# Patient Record
Sex: MALE | Race: WHITE | NOT HISPANIC OR LATINO | Employment: STUDENT | URBAN - METROPOLITAN AREA
[De-identification: names, ages, dates, MRNs, and addresses within clinical notes are randomized per-mention and may not be internally consistent; named-entity substitution may affect disease eponyms.]

---

## 2024-02-03 ENCOUNTER — APPOINTMENT (OUTPATIENT)
Dept: RADIOLOGY | Facility: HOSPITAL | Age: 24
End: 2024-02-03
Payer: MEDICARE

## 2024-02-03 ENCOUNTER — HOSPITAL ENCOUNTER (EMERGENCY)
Facility: HOSPITAL | Age: 24
Discharge: HOME | End: 2024-02-03
Attending: EMERGENCY MEDICINE
Payer: MEDICARE

## 2024-02-03 VITALS
WEIGHT: 165 LBS | HEART RATE: 74 BPM | HEIGHT: 71 IN | SYSTOLIC BLOOD PRESSURE: 140 MMHG | OXYGEN SATURATION: 100 % | DIASTOLIC BLOOD PRESSURE: 76 MMHG | BODY MASS INDEX: 23.1 KG/M2 | TEMPERATURE: 98.8 F | RESPIRATION RATE: 14 BRPM

## 2024-02-03 DIAGNOSIS — S70.02XA CONTUSION OF LEFT HIP, INITIAL ENCOUNTER: Primary | ICD-10-CM

## 2024-02-03 PROCEDURE — 73502 X-RAY EXAM HIP UNI 2-3 VIEWS: CPT | Mod: LT

## 2024-02-03 PROCEDURE — 73502 X-RAY EXAM HIP UNI 2-3 VIEWS: CPT | Mod: LEFT SIDE | Performed by: RADIOLOGY

## 2024-02-03 PROCEDURE — 99283 EMERGENCY DEPT VISIT LOW MDM: CPT | Performed by: EMERGENCY MEDICINE

## 2024-02-03 ASSESSMENT — COLUMBIA-SUICIDE SEVERITY RATING SCALE - C-SSRS
2. HAVE YOU ACTUALLY HAD ANY THOUGHTS OF KILLING YOURSELF?: NO
6. HAVE YOU EVER DONE ANYTHING, STARTED TO DO ANYTHING, OR PREPARED TO DO ANYTHING TO END YOUR LIFE?: NO
1. IN THE PAST MONTH, HAVE YOU WISHED YOU WERE DEAD OR WISHED YOU COULD GO TO SLEEP AND NOT WAKE UP?: NO

## 2024-02-03 ASSESSMENT — PAIN - FUNCTIONAL ASSESSMENT: PAIN_FUNCTIONAL_ASSESSMENT: 0-10

## 2024-02-03 ASSESSMENT — PAIN SCALES - GENERAL: PAINLEVEL_OUTOF10: 7

## 2024-02-04 NOTE — ED PROVIDER NOTES
HPI   Chief Complaint   Patient presents with    Hip Pain     injury       HPI       23-year-old male with left hip pain.  He was at a hockey game when his left hip struck another player.  Sudden onset of pain.  Had difficulty moving afterwards.  No radiation of pain.  No abdominal pain.  No head injury.  No nausea or vomiting             No data recorded                Patient History   No past medical history on file.  No past surgical history on file.  No family history on file.  Social History     Tobacco Use    Smoking status: Not on file    Smokeless tobacco: Not on file   Substance Use Topics    Alcohol use: Not on file    Drug use: Not on file       Physical Exam   ED Triage Vitals [02/03/24 2056]   Temperature Heart Rate Respirations BP   37.1 °C (98.8 °F) 74 14 140/76      Pulse Ox Temp Source Heart Rate Source Patient Position   100 % Oral Monitor Sitting      BP Location FiO2 (%)     Left arm --       Physical Exam    Gen:  Well nourished, no acute distress  Head: Normocephalic, atraumatic  Eyes: PERRL, EOMI, conjunctiva clear  ENT: External ears and nose normal, OP clear, Mucosa moist  Neck: Supple, no tenderness  Chest: No tenderness, no crepitus  CV: Regular rate and rhythm, no Murmur  Lungs: Clear bilaterally, no distress  Abd: No tenderness, no rebound or guarding  Extremities: FROM, no edema, tenderness, ecchymosis and swelling at the tip of the iliac crest.  No obvious tenderness of the femoral trochanter or hip  Neuro: Cranial nerves intact, moving all 4 extremities, A&O x 4  Psych: Appropriate behavior and affect  Back: No focal tenderness, no CVA tenderness  Skin: No rashes or lesions noted    ED Course & MDM     X-ray of the hip and pelvis were ordered by me, reviewed independently myself interpreted by radiology.  There is no acute fracture dislocation abnormality    Patient received a direct hip to the pelvic wing.  He has a bruise in the soft tissue that area.  There is no evidence of any  upper abdominal or flank injury or discomfort.  He does have some slight pain with range of motion of the hip but mostly tender in the lateral pelvis.  X-rays are negative for acute fracture.  Clinically I see no signs of acute intra-abdominal trauma or injury, hip fracture or pelvic fracture.  Will discharge home with symptomatic care return as needed  Diagnoses as of 02/03/24 2224   Contusion of left hip, initial encounter       Medical Decision Making      Procedure  Procedures     Natalio Castillo MD  02/03/24 2222

## 2024-02-04 NOTE — DISCHARGE INSTRUCTIONS
Rest and ice at home.  Ibuprofen or Tylenol as needed.  Return to the ER for new or worsening symptoms

## 2024-02-04 NOTE — ED TRIAGE NOTES
Pt presents to the ED with c/c f LEFT hip injury. Pt was playing sports when another player hit him in the left hip with either his knee or his full body.